# Patient Record
Sex: FEMALE | Race: WHITE | NOT HISPANIC OR LATINO | Employment: FULL TIME | URBAN - METROPOLITAN AREA
[De-identification: names, ages, dates, MRNs, and addresses within clinical notes are randomized per-mention and may not be internally consistent; named-entity substitution may affect disease eponyms.]

---

## 2017-06-15 ENCOUNTER — ALLSCRIPTS OFFICE VISIT (OUTPATIENT)
Dept: OTHER | Facility: OTHER | Age: 22
End: 2017-06-15

## 2017-06-15 ENCOUNTER — LAB REQUISITION (OUTPATIENT)
Dept: LAB | Facility: HOSPITAL | Age: 22
End: 2017-06-15
Payer: COMMERCIAL

## 2017-06-15 DIAGNOSIS — Z11.3 ENCOUNTER FOR SCREENING FOR INFECTIONS WITH PREDOMINANTLY SEXUAL MODE OF TRANSMISSION: ICD-10-CM

## 2017-06-15 DIAGNOSIS — Z01.419 ENCOUNTER FOR GYNECOLOGICAL EXAMINATION WITHOUT ABNORMAL FINDING: ICD-10-CM

## 2017-06-15 PROCEDURE — 87491 CHLMYD TRACH DNA AMP PROBE: CPT | Performed by: OBSTETRICS & GYNECOLOGY

## 2017-06-15 PROCEDURE — G0145 SCR C/V CYTO,THINLAYER,RESCR: HCPCS | Performed by: OBSTETRICS & GYNECOLOGY

## 2017-06-15 PROCEDURE — 87591 N.GONORRHOEAE DNA AMP PROB: CPT | Performed by: OBSTETRICS & GYNECOLOGY

## 2017-06-23 LAB
CHLAMYDIA DNA CVX QL NAA+PROBE: NORMAL
N GONORRHOEA DNA GENITAL QL NAA+PROBE: NORMAL

## 2017-06-25 ENCOUNTER — GENERIC CONVERSION - ENCOUNTER (OUTPATIENT)
Dept: OTHER | Facility: OTHER | Age: 22
End: 2017-06-25

## 2017-06-28 ENCOUNTER — GENERIC CONVERSION - ENCOUNTER (OUTPATIENT)
Dept: OTHER | Facility: OTHER | Age: 22
End: 2017-06-28

## 2017-06-28 LAB
LAB AP GYN PRIMARY INTERPRETATION: NORMAL
Lab: NORMAL

## 2018-01-12 NOTE — RESULT NOTES
Verified Results  (B) C/V/U EXPANDED W/O PAP W/O HPV PLUS (NON-[place]) [id number] 01:32PM Stitzer Bride     Test Name Result Flag Reference   Atopobium Vaginae (NON-[place]) Not Detected     SPECIMEN SOURCE:      C/V/U EXPANDED W/O PAP W/O HPV PLUS ([place])  CLINICAL INFORMATION: Provided Diagnosis Codes: N76 0,N89 8  MOLECULAR               INTERPRETATION:       Results do not favor Bacterial Vaginosis (BV)  MOLECULAR COMMENT:    Lactobacillus DNA is detected  No anaerobic                          bacterial DNA (from G  vaginalis, A  vaginae,                         Megasphaera, BVAB2) is detected  Chlamydia Trachomatis by Multiplex PCR Not Detected     GC BY MULTIPLEX PCR Not Detected     Ureaplasma By Multiplex PCR Detected A    Mycoplasma Genitalium By Multiplex PCR Not Detected     Trichomonas By Multiplex PCR Not Detected     Gardnerella by Multiplex PCR ([place]) Not Detected     Herpes Simplex I by Multiplex ([place]) Not Detected     Herpes Simplex II by Multiplex ([place]) Not Detected     BVAB2 [place] Not Detected     LACTOBACILLUS SPECIES [place] Detected     MEGASPHAERA TYPE-1 Not Detected     CANDIDA GENUS Detected A    C ALBICANS BY PCR Not Detected       (B) CANDIDA REFLEXED SPECIES [id number] 01:32PM Stitzer FigCarde     Test Name Result Flag Reference   C  GLABRATA BY PCR ([place]) Not Detected     C DUBLINIENSIS BY PCR (NON-[place]) Not Detected     PARAPSILOSIS BY PCR ([place]) Not Detected     C TROPICALIS BY PCR (NON[place]) Not Detected     C KRUSEI BY PCR (NON-[place]) Not Detected     ATOPOBIUM VAGINAE (NON-[place]) (4,6,7)  CHLAMYDIA TRACHOMATIS BY MULTIPLEX PCR (1,6,7)  GC BY MULTIPLEX PCR (1,6,7)  UREAPLASMA BY MULTIPLEX PCR (1,2,6,7)  MYCOPLASMA GENITALIUM BY MULTIPLEX PCR (1,2,6,7)  TRICHOMONAS BY MULTIPLEX PCR (1,6,7)  GARDNERELLA BY MULTIPLEX PCR (NON-[place]) (4,6,7)  HERPES SIMPLEX I BY MULTIPLEX ([place]) (1,6,7)  HERPES SIMPLEX II BY MULTIPLEX ([place]) (1,6,7)  BVAB2 NON-[place] (4,6,7)  LACTOBACILLUS SPECIES NON-[place] (4,6,7)  MEGASPHAERA TYPE-1 (4,6,7)  CANDIDA GENUS (3,5,6,7)  C ALBICANS BY PCR (3,5,6,7)  C  GLABRATA BY PCR (NON-[place]) (3,5,6,7)  C DUBLINIENSIS BY PCR (NON-[place]) (3,5,6,7)  C  PARAPSILOSIS BY PCR  (NON-[place]) (3,5,6,7)  C TROPICALIS BY PCR (NON-[place]) (3,5,6,7)  C KRUSEI BY PCR (NON-[place]) (3,5,6,7)  (1)  This test is an in vitro test for the detection of sexually transmitted   infections (STIs) in clinical specimens  The test utilizes   amplification of target DNA by the Polymerase Chain Reaction (PCR)   based on dual priming oligonucleotide technology and detects STI DNA  (2)  Mycoplasma genitalium is a causative agent of Nongonoccocal urethritis   (MOLLY) in men and cervicitis in women  Ureaplasma spp  is associated   with MOLLY in men  (3)  The common causes of vulvovaginal candidiasis include Candida albicans,   Candida tropicalis, Candida glabrata, Candida parapsilosis, Candida   dubliniensis, Cici krusei and a few other species that colonize the   vagina  In several studies, non-albicans species such as tropicalis,   krusei and glabrata have demonstrated resistance to fluconazole and   other related azole antifungals  Using comparable data from another   assay (BD Affirm VPIII microbial identification test), molecular   testing using liquid-based cytology specimens in general for Candida   may not be as sensitive as culture or visual identification  (4)  Target DNA for these organisms was amplified in a multiplex polymerase   chain reaction (PCR) and amplicons were  and identified by   capillary electrophoresis  (5)  This test utilizes amplification of target DNA for Candida genus and   selected Candida species by Polymerase Chain Reaction (PCR)  The limit   of detection for these assays is 0 9 pg for Candida genus, 9 pg for C    albicans, 10 pg for C  dubliniensis,  5 pg for C  glabrata, 20 pg for   C  krusei, 20 pg for C  parapsilosis, and 5 pg for C  tropicalis    (6)  This test was evaluated and its performance characteristics determined   by [facility]  It has not been cleared or approved by   the [place]  Food and Drug Administration  The FDA has determined that   such clearance or approval is not necessary  [facility]   is certified under the Clinical Laboratory Improvement Act of 1988   (CLIA) as qualified to perform high complexity clinical testing   (7)  Results should be interpreted together with past and current clinical   and laboratory data  Plan  Discharge of vagina    · Azithromycin 500 MG Oral Tablet;  Take both tablets po at once

## 2018-01-13 VITALS
DIASTOLIC BLOOD PRESSURE: 64 MMHG | HEART RATE: 81 BPM | HEIGHT: 60 IN | OXYGEN SATURATION: 99 % | SYSTOLIC BLOOD PRESSURE: 112 MMHG | BODY MASS INDEX: 21.6 KG/M2 | WEIGHT: 110 LBS

## 2018-01-16 NOTE — RESULT NOTES
Verified Results  (1) THIN PREP PAP WITH IMAGING 15Jun2017 10:34AM Letitia Lai Order Number: [id number]     Test Name Result Flag Reference   LAB AP CASE REPORT (Report)     Gynecologic Cytology Report            Case: [id number]                  Authorizing Provider: Kevin Montes MD     Collected:      06/15/2017 1034        First Screen:     GRECIA Romero Received:      06/23/2017 4342        Rescreen:       GRECIA Casas                              Specimen:  LIQUID-BASED PAP, SCREENING, Cervix   LAB AP GYN PRIMARY INTERPRETATION      Negative for intraepithelial lesion or malignancy  Electronically signed by GRECIA Casas on 6/28/2017 at 10:43 AM   LAB AP GYN SPECIMEN ADEQUACY      Satisfactory for evaluation  Endocervical/transformation zone component present  LAB AP GYN ADDITIONAL INFORMATION (Report)     [facility]'s FDA approved ,  and ThinPrep Imaging System are   utilized with strict adherence to the 's instruction manual to   prepare gynecologic and non-gynecologic cytology specimens for the   production of ThinPrep slides as well as for gynecologic ThinPrep imaging  These processes have been validated by our laboratory and/or by the     The Pap test is not a diagnostic procedure and should not be used as the   sole means to detect cervical cancer  It is only a screening procedure to   aid in the detection of cervical cancer and its precursors  Both   false-negative and false-positive results have been experienced  Your   patient's test result should be interpreted in this context together with   the history and clinical findings

## 2018-01-17 NOTE — RESULT NOTES
Verified Results  (1) CHLAMYDIA/GC AMPLIFIED DNA, PCR [id number] 10:34AM Norberto Justice    Order Number: [id number]     Test Name Result Flag Reference   CHLAMYDIA,AMPLIFIED DNA PROBE   C  trachomatis Amplified DNA Negative   C  trachomatis Amplified DNA Negative   N  GONORRHOEAE AMPLIFIED DNA   N  gonorrhoeae Amplified DNA Negative   N  gonorrhoeae Amplified DNA Negative

## 2018-06-21 ENCOUNTER — ANNUAL EXAM (OUTPATIENT)
Dept: GYNECOLOGY | Facility: CLINIC | Age: 23
End: 2018-06-21
Payer: COMMERCIAL

## 2018-06-21 VITALS
WEIGHT: 115.8 LBS | HEART RATE: 76 BPM | DIASTOLIC BLOOD PRESSURE: 62 MMHG | SYSTOLIC BLOOD PRESSURE: 106 MMHG | HEIGHT: 60 IN | BODY MASS INDEX: 22.74 KG/M2

## 2018-06-21 DIAGNOSIS — Z01.419 ENCOUNTER FOR ANNUAL ROUTINE GYNECOLOGICAL EXAMINATION: Primary | ICD-10-CM

## 2018-06-21 DIAGNOSIS — Z11.3 ROUTINE SCREENING FOR STI (SEXUALLY TRANSMITTED INFECTION): ICD-10-CM

## 2018-06-21 PROCEDURE — 87491 CHLMYD TRACH DNA AMP PROBE: CPT | Performed by: OBSTETRICS & GYNECOLOGY

## 2018-06-21 PROCEDURE — S0612 ANNUAL GYNECOLOGICAL EXAMINA: HCPCS | Performed by: OBSTETRICS & GYNECOLOGY

## 2018-06-21 PROCEDURE — 87591 N.GONORRHOEAE DNA AMP PROB: CPT | Performed by: OBSTETRICS & GYNECOLOGY

## 2018-06-21 NOTE — PROGRESS NOTES
Assessment/Plan:  Normal gynecologic exam  GC, chlamydia Cx's  Self breast exams monthly-   Calcium 1000 milligrams daily- she does  Exercise- she does daily  Return the office in 1 year  Diagnoses and all orders for this visit:    Encounter for annual routine gynecological examination    Routine screening for STI (sexually transmitted infection)  -     Chlamydia/GC amplified DNA by PCR              Subjective:        Patient ID: Lesa Sy is a 24 y o  female  Shadia Ortiz is here for an annual visit  She just graduated the [employer] and is looking for some post graduate work and division 1 school for for athletic training  Her periods are regular  She is not currently in a relationship  She had intercourse last 2 months ago using condoms  The following portions of the patient's history were reviewed and updated as appropriate: She  has no past medical history on file  Patient Active Problem List    Diagnosis Date Noted    Encounter for annual routine gynecological examination 06/21/2018    Routine screening for STI (sexually transmitted infection) 06/21/2018   PMH:  Nephrolithiasis 9/15   Chlamydia '15  Ureaplasma, Monilia '16     She  has a past surgical history that includes Tonsillectomy and Wrist surgery (Left)  Her family history includes Hypertension in her father and mother; Nephrolithiasis in her father and mother  M- HTN, nephrolithiasis  She  reports that she has never smoked  She has never used smokeless tobacco  She reports that she drinks alcohol  She reports that she does not use drugs  SH:  [employer] '17, will be going to the [employer] for graduate school in athletic training  She is currently not sexually active  She just graduated the [employer] and is looking for some post graduate work and division 1 school for for athletic training  No current outpatient prescriptions on file       No current facility-administered medications for this visit  No current outpatient prescriptions on file prior to visit  No current facility-administered medications on file prior to visit  She has No Known Allergies       Review of Systems   Constitutional: Negative for activity change, appetite change, fatigue and unexpected weight change  Eyes: Negative for visual disturbance  Respiratory: Negative for cough, chest tightness, shortness of breath and wheezing  Cardiovascular: Negative for chest pain, palpitations and leg swelling  Breast: Patient denies tenderness, nipple discharge, masses, or erythema  Gastrointestinal: Negative for abdominal distention, abdominal pain, blood in stool, constipation, diarrhea, nausea and vomiting  Endocrine: Negative for cold intolerance and heat intolerance  Genitourinary: Negative for decreased urine volume, difficulty urinating, dyspareunia, dysuria, frequency, hematuria, menstrual problem, pelvic pain, urgency, vaginal bleeding, vaginal discharge and vaginal pain  Musculoskeletal: Negative for arthralgias  Skin: Negative for rash  Neurological: Negative for weakness, light-headedness, numbness and headaches  Hematological: Does not bruise/bleed easily  Psychiatric/Behavioral: Negative for agitation, behavioral problems and sleep disturbance  The patient is not nervous/anxious  Objective:    Vitals:    06/21/18 1334   BP: 106/62   BP Location: Right arm   Patient Position: Sitting   Cuff Size: Standard   Pulse: 76   Weight: 52 5 kg (115 lb 12 8 oz)   Height: 5' (1 524 m)            Physical Exam   Constitutional: She is oriented to person, place, and time  She appears well-developed and well-nourished  HENT:   Head: Normocephalic and atraumatic  Eyes: Conjunctivae and EOM are normal  Pupils are equal, round, and reactive to light  Neck: Normal range of motion  Neck supple  No tracheal deviation present   No thyromegaly present  Cardiovascular: Normal rate, regular rhythm and normal heart sounds  No murmur heard  Pulmonary/Chest: Effort normal and breath sounds normal  No respiratory distress  She has no wheezes  Right breast exhibits no inverted nipple, no mass, no nipple discharge, no skin change and no tenderness  Left breast exhibits no inverted nipple, no mass, no nipple discharge, no skin change and no tenderness  Breasts are symmetrical    Abdominal: Soft  Bowel sounds are normal  She exhibits no distension and no mass  There is no tenderness  Genitourinary: Vagina normal and uterus normal  Rectal exam shows no external hemorrhoid  No breast swelling, tenderness, discharge or bleeding  There is no rash, tenderness or lesion on the right labia  There is no rash, tenderness or lesion on the left labia  Uterus is not deviated, not enlarged and not tender  Cervix exhibits no motion tenderness and no discharge  Right adnexum displays no mass, no tenderness and no fullness  Left adnexum displays no mass, no tenderness and no fullness  Musculoskeletal: Normal range of motion  Neurological: She is alert and oriented to person, place, and time  Skin: Skin is warm and dry  Psychiatric: She has a normal mood and affect  Her behavior is normal  Judgment and thought content normal    Nursing note and vitals reviewed

## 2018-06-25 LAB
CHLAMYDIA DNA CVX QL NAA+PROBE: NORMAL
N GONORRHOEA DNA GENITAL QL NAA+PROBE: NORMAL

## 2018-09-04 ENCOUNTER — APPOINTMENT (OUTPATIENT)
Dept: LAB | Age: 23
End: 2018-09-04
Attending: PREVENTIVE MEDICINE

## 2018-09-04 ENCOUNTER — TRANSCRIBE ORDERS (OUTPATIENT)
Dept: ADMINISTRATIVE | Age: 23
End: 2018-09-04

## 2018-09-04 DIAGNOSIS — Z01.84 IMMUNITY STATUS TESTING: ICD-10-CM

## 2018-09-04 DIAGNOSIS — Z11.1 SCREENING EXAMINATION FOR PULMONARY TUBERCULOSIS: ICD-10-CM

## 2018-09-04 DIAGNOSIS — Z01.84 IMMUNITY STATUS TESTING: Primary | ICD-10-CM

## 2018-09-04 PROCEDURE — 86787 VARICELLA-ZOSTER ANTIBODY: CPT

## 2018-09-04 PROCEDURE — 36415 COLL VENOUS BLD VENIPUNCTURE: CPT

## 2018-09-04 PROCEDURE — 86480 TB TEST CELL IMMUN MEASURE: CPT

## 2018-09-06 LAB
ANNOTATION COMMENT IMP: NORMAL
GAMMA INTERFERON BACKGROUND BLD IA-ACNC: 0.03 IU/ML
M TB IFN-G BLD-IMP: NEGATIVE
M TB IFN-G CD4+ BCKGRND COR BLD-ACNC: 0.05 IU/ML
M TB IFN-G CD4+ T-CELLS BLD-ACNC: 0.08 IU/ML
MITOGEN IGNF BLD-ACNC: 8.03 IU/ML
QUANTIFERON-TB GOLD IN TUBE: NORMAL
SERVICE CMNT-IMP: NORMAL
VZV IGG SER IA-ACNC: NORMAL

## 2019-07-08 ENCOUNTER — ANNUAL EXAM (OUTPATIENT)
Dept: GYNECOLOGY | Facility: CLINIC | Age: 24
End: 2019-07-08
Payer: COMMERCIAL

## 2019-07-08 VITALS
BODY MASS INDEX: 23.48 KG/M2 | HEIGHT: 60 IN | SYSTOLIC BLOOD PRESSURE: 116 MMHG | WEIGHT: 119.6 LBS | DIASTOLIC BLOOD PRESSURE: 52 MMHG

## 2019-07-08 DIAGNOSIS — Z01.419 ENCOUNTER FOR ANNUAL ROUTINE GYNECOLOGICAL EXAMINATION: Primary | ICD-10-CM

## 2019-07-08 PROCEDURE — 99395 PREV VISIT EST AGE 18-39: CPT | Performed by: OBSTETRICS & GYNECOLOGY

## 2019-07-08 NOTE — PROGRESS NOTES
Assessment/Plan:  Normal gynecologic exam  Pap q 3 yrs- '20  GC, chlamydia Cx's- NA  Self breast exams monthly-   Calcium 1000 milligrams daily- she does  Exercise- she does daily  Return the office in 1 year            Diagnoses and all orders for this visit:    Encounter for annual routine gynecological examination              Subjective:        Patient ID: Perla yDer is a 21 y o  female  Cheryle Cough is here for her annual visit  She is without complaints  Her menses are regular  She is not in a relationship  The following portions of the patient's history were reviewed and updated as appropriate: She  has no past medical history on file  Patient Active Problem List    Diagnosis Date Noted    Encounter for annual routine gynecological examination 06/21/2018    Routine screening for STI (sexually transmitted infection) 06/21/2018   PMH:  Nephrolithiasis 9/15   Chlamydia '15      Ureaplasma, Monilia '16   She  has a past surgical history that includes Tonsillectomy and Wrist surgery (Left)  Her family history includes Hypertension in her father and mother; Nephrolithiasis in her father and mother  FH:  F, M- HTN, nephrolithiasis  She  reports that she has never smoked  She has never used smokeless tobacco  She reports that she drinks alcohol  She reports that she does not use drugs  SH:  [employer] '17, [employer] 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  She is currently not sexually active  Works for [employer] as the  for [employer]  She will be changing to [address] next year which will be less of a commute  No current outpatient medications on file  No current facility-administered medications for this visit  No current outpatient medications on file prior to visit  No current facility-administered medications on file prior to visit  She has No Known Allergies       Review of Systems   Constitutional: Negative for activity change, appetite change, fatigue and unexpected weight change  Eyes: Negative for visual disturbance  Respiratory: Negative for cough, chest tightness, shortness of breath and wheezing  Cardiovascular: Negative for chest pain, palpitations and leg swelling  Breast: Patient denies tenderness, nipple discharge, masses, or erythema  Gastrointestinal: Negative for abdominal distention, abdominal pain, blood in stool, constipation, diarrhea, nausea and vomiting  Endocrine: Negative for cold intolerance and heat intolerance  Genitourinary: Negative for decreased urine volume, difficulty urinating, dyspareunia, dysuria, frequency, hematuria, menstrual problem, pelvic pain, urgency, vaginal bleeding, vaginal discharge and vaginal pain  Musculoskeletal: Negative for arthralgias  Skin: Negative for rash  Neurological: Negative for weakness, light-headedness, numbness and headaches  Hematological: Does not bruise/bleed easily  Psychiatric/Behavioral: Negative for agitation, behavioral problems and sleep disturbance  The patient is not nervous/anxious  Objective: There were no vitals filed for this visit  Physical Exam   Constitutional: She is oriented to person, place, and time  She appears well-developed and well-nourished  HENT:   Head: Normocephalic and atraumatic  Eyes: Pupils are equal, round, and reactive to light  Conjunctivae and EOM are normal    Neck: Normal range of motion  Neck supple  No tracheal deviation present  No thyromegaly present  Cardiovascular: Normal rate, regular rhythm and normal heart sounds  No murmur heard  Pulmonary/Chest: Effort normal and breath sounds normal  No respiratory distress  She has no wheezes  Right breast exhibits no inverted nipple, no mass, no nipple discharge, no skin change and no tenderness   Left breast exhibits no inverted nipple, no mass, no nipple discharge, no skin change and no tenderness  No breast tenderness, discharge or bleeding  Breasts are symmetrical    Abdominal: Soft  Bowel sounds are normal  She exhibits no distension and no mass  There is no tenderness  Genitourinary: Vagina normal and uterus normal  Rectal exam shows no external hemorrhoid  No breast tenderness, discharge or bleeding  There is no rash, tenderness or lesion on the right labia  There is no rash, tenderness or lesion on the left labia  Uterus is not deviated, not enlarged and not tender  Cervix exhibits no motion tenderness and no discharge  Right adnexum displays no mass, no tenderness and no fullness  Left adnexum displays no mass, no tenderness and no fullness  Genitourinary Comments: Urethral meatus within normal limits  Perineum within normal limits  Bladder well supported  Musculoskeletal: Normal range of motion  Neurological: She is alert and oriented to person, place, and time  Skin: Skin is warm and dry  Psychiatric: She has a normal mood and affect  Her behavior is normal  Judgment and thought content normal    Nursing note and vitals reviewed

## 2020-01-27 ENCOUNTER — TELEPHONE (OUTPATIENT)
Dept: GYNECOLOGY | Facility: CLINIC | Age: 25
End: 2020-01-27

## 2020-01-27 NOTE — TELEPHONE ENCOUNTER
P minimal symptoms right now, itching is less than it was yesterday  She uses tampons  Hydrocortisone ointment if needed  Call if symptoms persist after the menses

## 2020-01-27 NOTE — TELEPHONE ENCOUNTER
States last Sunday she started with what felt like a mild yeast infection  Used a 3 day monistat, felt a little better, however still had some itching, yesterday she noticed a strong odor  Today her period started  Wanted to know if there was anything that could be prescribed or if she needed to be seen  Her period can be heavy and can last 5-7 days  Was not sure if she should wait until her period ends, or if she should come in today, or if you had any other suggestions

## 2020-02-08 ENCOUNTER — OFFICE VISIT (OUTPATIENT)
Dept: URGENT CARE | Facility: CLINIC | Age: 25
End: 2020-02-08
Payer: COMMERCIAL

## 2020-02-08 VITALS
HEIGHT: 60 IN | WEIGHT: 115 LBS | BODY MASS INDEX: 22.58 KG/M2 | TEMPERATURE: 97.8 F | DIASTOLIC BLOOD PRESSURE: 65 MMHG | RESPIRATION RATE: 16 BRPM | HEART RATE: 68 BPM | SYSTOLIC BLOOD PRESSURE: 131 MMHG

## 2020-02-08 DIAGNOSIS — H65.01 RIGHT ACUTE SEROUS OTITIS MEDIA, RECURRENCE NOT SPECIFIED: Primary | ICD-10-CM

## 2020-02-08 PROCEDURE — G0382 LEV 3 HOSP TYPE B ED VISIT: HCPCS | Performed by: PHYSICIAN ASSISTANT

## 2020-02-08 RX ORDER — CLINDAMYCIN AND BENZOYL PEROXIDE 10; 50 MG/G; MG/G
GEL TOPICAL
COMMUNITY
Start: 2020-01-07

## 2020-02-08 RX ORDER — PSEUDOEPHEDRINE HCL 60 MG/1
60 TABLET ORAL EVERY 6 HOURS PRN
Qty: 30 TABLET | Refills: 0 | Status: SHIPPED | OUTPATIENT
Start: 2020-02-08 | End: 2020-07-15

## 2020-02-08 RX ORDER — DAPSONE 50 MG/G
GEL TOPICAL
COMMUNITY
Start: 2020-01-02

## 2020-02-08 RX ORDER — TRETINOIN 1 MG/G
CREAM TOPICAL
COMMUNITY
Start: 2019-12-31

## 2020-02-08 RX ORDER — PREDNISONE 50 MG/1
50 TABLET ORAL DAILY
Qty: 5 TABLET | Refills: 0 | Status: SHIPPED | OUTPATIENT
Start: 2020-02-08 | End: 2020-02-13

## 2020-02-08 NOTE — PROGRESS NOTES
[facility]        NAME: Candice Mcdonough is a 21 y o  female  : 1995    MRN: [id number]  DATE: 2020  TIME: 9:57 AM    Assessment and Plan   Right acute serous otitis media, recurrence not specified [H65 01]  1  Right acute serous otitis media, recurrence not specified  predniSONE 50 mg tablet    pseudoephedrine (SUDAFED) 60 mg tablet         Patient Instructions   Prescriptions sent to the pharmacy for prednisone and pseudoephedrine-use as directed  Saline nasal spray several times daily, Flonase in a m , Tylenol/ibuprofen as  needed for fever or pain  Follow up with an ENT specialist if symptoms persist   Proceed to  ER if symptoms worsen  Chief Complaint     Chief Complaint   Patient presents with    Earache     R ear pain started 2 days ago, no other sx, comes and goes         History of Present Illness   The patient is a 53-year-old female who presents with right ear pain that started 2 days ago  Negative drainage or hearing changes  Negative left ear pain  Mild nasal and sinus congestion  Negative cough  Negative sore throat  Negative fever or shaking chills  Negative dizziness  Negative headache  HPI    Review of Systems   Review of Systems   Constitutional: Negative for activity change, chills, fatigue and fever  HENT: Positive for congestion and ear pain  Negative for ear discharge, facial swelling, hearing loss, mouth sores, postnasal drip, rhinorrhea, sinus pressure, sinus pain, sneezing, sore throat and trouble swallowing  Eyes: Negative for pain, discharge, redness and itching  Respiratory: Negative for apnea, cough, chest tightness, shortness of breath, wheezing and stridor  Cardiovascular: Negative for chest pain  Gastrointestinal: Negative for abdominal distention, abdominal pain, diarrhea, nausea and vomiting  Genitourinary: Negative for difficulty urinating  Musculoskeletal: Negative for arthralgias and myalgias     Skin: Negative for pallor and rash  Allergic/Immunologic: Negative  Neurological: Negative for dizziness, light-headedness and headaches  Hematological: Negative  Psychiatric/Behavioral: Negative  All other systems reviewed and are negative  Current Medications       Current Outpatient Medications:     clindamycin-benzoyl peroxide (BENZACLIN) gel, , Disp: , Rfl:     Dapsone 5 % topical gel, , Disp: , Rfl:     tretinoin (RETIN-A) 0 1 % cream, , Disp: , Rfl:     predniSONE 50 mg tablet, Take 1 tablet (50 mg total) by mouth daily for 5 days, Disp: 5 tablet, Rfl: 0    pseudoephedrine (SUDAFED) 60 mg tablet, Take 1 tablet (60 mg total) by mouth every 6 (six) hours as needed for congestion, Disp: 30 tablet, Rfl: 0    Current Allergies     Allergies as of 02/08/2020    (No Known Allergies)            The following portions of the patient's history were reviewed and updated as appropriate: allergies, current medications, past family history, past medical history, past social history, past surgical history and problem list      Past Medical History:   Diagnosis Date    Kidney stones        Past Surgical History:   Procedure Laterality Date    TONSILLECTOMY      WRIST SURGERY Left        Family History   Problem Relation Age of Onset    Hypertension Mother     Nephrolithiasis Mother     Hypertension Father     Nephrolithiasis Father          Medications have been verified  Objective   /65 (Patient Position: Sitting)   Pulse 68   Temp 97 8 °F (36 6 °C) (Temporal)   Resp 16   Ht 5' (1 524 m)   Wt 52 2 kg (115 lb)   BMI 22 46 kg/m²        Physical Exam     Physical Exam   Constitutional: She appears well-developed and well-nourished  No distress  HENT:   Head: Normocephalic and atraumatic  Right Ear: Hearing, external ear and ear canal normal  No lacerations  No drainage, swelling or tenderness  No foreign bodies  No mastoid tenderness   Tympanic membrane is not injected, not scarred, not perforated, not erythematous, not retracted and not bulging  A middle ear effusion is present  No hemotympanum  No decreased hearing is noted  Left Ear: Hearing, tympanic membrane, external ear and ear canal normal  No lacerations  No drainage, swelling or tenderness  No foreign bodies  No mastoid tenderness  Tympanic membrane is not injected, not scarred, not perforated, not erythematous, not retracted and not bulging  No middle ear effusion  No hemotympanum  No decreased hearing is noted  Nose: Nose normal    Mouth/Throat: Oropharynx is clear and moist  No oropharyngeal exudate  Skin: She is not diaphoretic  Nursing note and vitals reviewed

## 2020-02-08 NOTE — PATIENT INSTRUCTIONS
Prescriptions sent to the pharmacy for prednisone and pseudoephedrine-use as directed  Saline nasal spray several times daily, Flonase in a m , Tylenol/ibuprofen as needed for fever or pain  Follow up with an ENT specialist if symptoms persist   Proceed to  ER if symptoms worsen  Serous Otitis Media   WHAT YOU NEED TO KNOW:   Serous otitis media is fluid trapped behind your tympanic membrane (eardrum), without an ear infection  Your eardrum is in your middle ear  Serous otitis media is also called otitis media with effusion  You may have fluid in your ear for months, but it usually goes away on its own  The fluid may be in one or both ears  The fluid may cause muffled sounds, and you may feel like your ears are full  Serous otitis media may be caused by an upper respiratory infection or allergies  It is most common in the fall and early spring  DISCHARGE INSTRUCTIONS:   Return to the emergency department if:   · You have a fever  · You have a sudden loss of hearing in your affected ear  · You develop a severe headache and stiff neck  · You have a seizure  Contact your healthcare provider if:   · You have fluid draining from your ear  · You have new symptoms  · You have questions or concerns about your condition or care  Follow up with your healthcare provider as directed: Your ears will need to be checked regularly  You may need to see a specialist  Write down your questions so you remember to ask them during your visits  © 2017 [address] Information is for End User's use only and may not be sold, redistributed or otherwise used for commercial purposes  All illustrations and images included in CareNotes® are the copyrighted property of A D A M , Inc  or Rubin Knight  The above information is an  only  It is not intended as medical advice for individual conditions or treatments   Talk to your doctor, nurse or pharmacist before following any medical regimen to see if it is safe and effective for you

## 2020-05-11 ENCOUNTER — OFFICE VISIT (OUTPATIENT)
Dept: URGENT CARE | Facility: CLINIC | Age: 25
End: 2020-05-11
Payer: COMMERCIAL

## 2020-05-11 VITALS
TEMPERATURE: 98.2 F | RESPIRATION RATE: 18 BRPM | HEART RATE: 83 BPM | BODY MASS INDEX: 22.97 KG/M2 | OXYGEN SATURATION: 99 % | WEIGHT: 117 LBS | HEIGHT: 60 IN | DIASTOLIC BLOOD PRESSURE: 80 MMHG | SYSTOLIC BLOOD PRESSURE: 135 MMHG

## 2020-05-11 DIAGNOSIS — S01.112A LACERATION OF LEFT EYEBROW, INITIAL ENCOUNTER: Primary | ICD-10-CM

## 2020-05-11 PROCEDURE — 12011 RPR F/E/E/N/L/M 2.5 CM/<: CPT | Performed by: PHYSICIAN ASSISTANT

## 2020-05-11 PROCEDURE — G0382 LEV 3 HOSP TYPE B ED VISIT: HCPCS | Performed by: PHYSICIAN ASSISTANT

## 2020-07-14 NOTE — PROGRESS NOTES
Assessment/Plan:  Normal gynecologic exam  Pap q 3 yrs- '20  GC, chlamydia Cx's  Self breast exams monthly-   Calcium 1000 milligrams daily- she does  Exercise- she does daily  Return the office in 1 year  Diagnoses and all orders for this visit:    Encounter for annual routine gynecological examination  -     Liquid-based pap, screening    Screening for cervical cancer  -     Liquid-based pap, screening    Exposure to sexually transmitted disease (STD)  -     Chlamydia/GC amplified DNA by PCR; Future  -     Chlamydia/GC amplified DNA by PCR              Subjective:        Patient ID: Caren Dempsey is a 31 y o  female  Benjariannaharish Pepper is here for her annual visit  She is without complaints  She still suffers from dysmenorrhea for which she uses Advil  It is usually worse day 2  Her mother developed breast cancer last year  She was in a 10 month relationship which ended recently  I asked if she would like cultures and she said yes  The following portions of the patient's history were reviewed and updated as appropriate: She  has a past medical history of Kidney stones  Patient Active Problem List    Diagnosis Date Noted    Encounter for annual routine gynecological examination 06/21/2018    Routine screening for STI (sexually transmitted infection) 06/21/2018   PMH:  Nephrolithiasis 9/15   Chlamydia '15      Ureaplasma, Monilia '16   She  has a past surgical history that includes Tonsillectomy and Wrist surgery (Left)  Her family history includes Breast cancer in her mother; Heart disease in her paternal grandfather; Hypertension in her father and mother; Nephrolithiasis in her father and mother; No Known Problems in her brother and sister  FH:  F, M- HTN, nephrolithiasis  M- Breast Ca 10/19 58  She  reports that she has never smoked  She has never used smokeless tobacco  She reports current alcohol use  She reports that she does not use drugs     SH:  [employer], [address] [facility] 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  She is currently not sexually active  Works for [employer] as the  for [employer]  Now is at [facility] but 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  Current Outpatient Medications   Medication Sig Dispense Refill    clindamycin-benzoyl peroxide (BENZACLIN) gel       Dapsone 5 % topical gel       tretinoin (RETIN-A) 0 1 % cream        No current facility-administered medications for this visit  Current Outpatient Medications on File Prior to Visit   Medication Sig    clindamycin-benzoyl peroxide (BENZACLIN) gel     Dapsone 5 % topical gel     tretinoin (RETIN-A) 0 1 % cream      No current facility-administered medications on file prior to visit  She has No Known Allergies       Review of Systems   Constitutional: Negative for activity change, appetite change, chills, fatigue, fever and unexpected weight change  HENT: Negative for congestion, rhinorrhea, sinus pressure, sore throat and trouble swallowing  Eyes: Negative for discharge, redness, itching and visual disturbance  Respiratory: Negative for cough, chest tightness, shortness of breath and wheezing  Cardiovascular: Negative for chest pain, palpitations and leg swelling  Gastrointestinal: Negative for abdominal distention, abdominal pain, blood in stool, constipation, diarrhea, nausea and vomiting  Genitourinary: Positive for menstrual problem  Negative for decreased urine volume, difficulty urinating, dyspareunia, dysuria, frequency, hematuria, pelvic pain, urgency, vaginal bleeding, vaginal discharge and vaginal pain  Musculoskeletal: Negative for arthralgias  Skin: Negative for rash  Neurological: Negative for weakness, light-headedness, numbness and headaches  Hematological: Does not bruise/bleed easily     Psychiatric/Behavioral: Negative for agitation, behavioral problems and sleep disturbance  The patient is not nervous/anxious  Objective:    Vitals:    07/15/20 0747   BP: 124/80   BP Location: Left arm   Patient Position: Sitting   Cuff Size: Standard   Temp: 99 2 °F (37 3 °C)   Weight: 50 5 kg (111 lb 6 4 oz)   Height: 5' (1 524 m)            Physical Exam   Constitutional: She is oriented to person, place, and time  She appears well-developed and well-nourished  HENT:   Head: Normocephalic and atraumatic  Eyes: Pupils are equal, round, and reactive to light  Conjunctivae and EOM are normal    Neck: Normal range of motion  Neck supple  No tracheal deviation present  No thyromegaly present  Cardiovascular: Normal rate, regular rhythm and normal heart sounds  No murmur heard  Pulmonary/Chest: Effort normal and breath sounds normal  No respiratory distress  She has no wheezes  Right breast exhibits no inverted nipple, no mass, no nipple discharge, no skin change and no tenderness  Left breast exhibits no inverted nipple, no mass, no nipple discharge, no skin change and no tenderness  No breast tenderness, discharge or bleeding  Breasts are symmetrical    Abdominal: Soft  Bowel sounds are normal  She exhibits no distension and no mass  There is no tenderness  Genitourinary: Vagina normal and uterus normal  Rectal exam shows no external hemorrhoid  No breast tenderness, discharge or bleeding  There is no rash, tenderness or lesion on the right labia  There is no rash, tenderness or lesion on the left labia  Uterus is not deviated, not enlarged and not tender  Cervix exhibits no motion tenderness and no discharge  Right adnexum displays no mass, no tenderness and no fullness  Left adnexum displays no mass, no tenderness and no fullness  Genitourinary Comments: Urethral meatus within normal limits  Perineum within normal limits  Bladder well supported  Musculoskeletal: Normal range of motion  Neurological: She is alert and oriented to person, place, and time  Skin: Skin is warm and dry  Psychiatric: She has a normal mood and affect  Her behavior is normal  Judgment and thought content normal    Nursing note and vitals reviewed

## 2020-07-15 ENCOUNTER — ANNUAL EXAM (OUTPATIENT)
Dept: GYNECOLOGY | Facility: CLINIC | Age: 25
End: 2020-07-15
Payer: COMMERCIAL

## 2020-07-15 VITALS
SYSTOLIC BLOOD PRESSURE: 124 MMHG | BODY MASS INDEX: 21.87 KG/M2 | DIASTOLIC BLOOD PRESSURE: 80 MMHG | HEIGHT: 60 IN | TEMPERATURE: 99.2 F | WEIGHT: 111.4 LBS

## 2020-07-15 DIAGNOSIS — Z12.4 SCREENING FOR CERVICAL CANCER: ICD-10-CM

## 2020-07-15 DIAGNOSIS — Z20.2 EXPOSURE TO SEXUALLY TRANSMITTED DISEASE (STD): ICD-10-CM

## 2020-07-15 DIAGNOSIS — Z01.419 ENCOUNTER FOR ANNUAL ROUTINE GYNECOLOGICAL EXAMINATION: Primary | ICD-10-CM

## 2020-07-15 PROCEDURE — 87591 N.GONORRHOEAE DNA AMP PROB: CPT | Performed by: OBSTETRICS & GYNECOLOGY

## 2020-07-15 PROCEDURE — 99395 PREV VISIT EST AGE 18-39: CPT | Performed by: OBSTETRICS & GYNECOLOGY

## 2020-07-15 PROCEDURE — G0124 SCREEN C/V THIN LAYER BY MD: HCPCS | Performed by: PATHOLOGY

## 2020-07-15 PROCEDURE — 87491 CHLMYD TRACH DNA AMP PROBE: CPT | Performed by: OBSTETRICS & GYNECOLOGY

## 2020-07-15 PROCEDURE — G0145 SCR C/V CYTO,THINLAYER,RESCR: HCPCS | Performed by: PATHOLOGY

## 2020-07-17 LAB
C TRACH DNA SPEC QL NAA+PROBE: NEGATIVE
N GONORRHOEA DNA SPEC QL NAA+PROBE: NEGATIVE

## 2020-07-22 LAB
LAB AP GYN PRIMARY INTERPRETATION: ABNORMAL
Lab: ABNORMAL
PATH INTERP SPEC-IMP: ABNORMAL

## 2020-07-23 ENCOUNTER — TELEPHONE (OUTPATIENT)
Dept: OTHER | Facility: OTHER | Age: 25
End: 2020-07-23

## 2020-07-23 NOTE — TELEPHONE ENCOUNTER
Patient is retuning a missed call from Dr Roberto Houston  Please call patient back regarding missed call

## 2020-08-14 PROCEDURE — 57454 BX/CURETT OF CERVIX W/SCOPE: CPT | Performed by: OBSTETRICS & GYNECOLOGY

## 2020-08-14 NOTE — PROGRESS NOTES
Assessment/Plan:  Normal colposcopic findings  Will repeat Pap next year  Moving to [place] for a job with the [employer] in September Diagnoses and all orders for this visit:    LGSIL on Pap smear of cervix  -     Colposcopy              Subjective:        Patient ID: Sesar Naranjo is a 31 y o  female  HPI    The following portions of the patient's history were reviewed and updated as appropriate: She  has a past medical history of Kidney stones  Patient Active Problem List    Diagnosis Date Noted    Encounter for annual routine gynecological examination 06/21/2018    Routine screening for STI (sexually transmitted infection) 06/21/2018     She  has a past surgical history that includes Tonsillectomy and Wrist surgery (Left)  Her family history includes Breast cancer in her mother; Heart disease in her paternal grandfather; Hypertension in her father and mother; Nephrolithiasis in her father and mother; No Known Problems in her brother and sister  She  reports that she has never smoked  She has never used smokeless tobacco  She reports current alcohol use  She reports that she does not use drugs  Current Outpatient Medications   Medication Sig Dispense Refill    clindamycin-benzoyl peroxide (BENZACLIN) gel       Dapsone 5 % topical gel       tretinoin (RETIN-A) 0 1 % cream        No current facility-administered medications for this visit  Current Outpatient Medications on File Prior to Visit   Medication Sig    clindamycin-benzoyl peroxide (BENZACLIN) gel     Dapsone 5 % topical gel     tretinoin (RETIN-A) 0 1 % cream      No current facility-administered medications on file prior to visit  She has No Known Allergies           Objective: There were no vitals filed for this visit              Colposcopy    Date/Time: 8/14/2020 6:22 PM  Performed by: Keny Rome MD  Authorized by: Keny Rome MD     Consent:     Consent obtained:  Verbal and written    Consent given by: Patient    Procedural risks discussed:  Bleeding, failure rate, infection and repeat procedure    Patient questions answered: yes      Patient agrees, verbalizes understanding, and wants to proceed: yes      Instructions and paperwork completed: yes    Pre-procedure:     Pre-procedure timeout performed: yes      Premeds:  Ibuprofen  Indication:     Indication:  LSIL  Procedure:     Procedure: Colposcopy w/ cervical biopsy and ECC      Under satisfactory analgesia the patient was prepped and draped in the dorsal lithotomy position: yes      Malone speculum was placed in the vagina: yes      Under colposcopic examination the transition zone was seen in entirety: yes      Endocervix was curetted using a Kevorkian curette: yes      Cervical biopsy performed with a cervical biopsy punch: yes      Monsel's solution was applied: yes      Biopsy(s): yes      Location:  6, 12    Specimen to pathology: yes    Comments:      Normal colposcopic findings  Squamocolumnar junction just at the outside of the external os  No lesions seen  Squamous metaplasia present posteriorly    The canal was normal  Biopsies well tolerated

## 2020-08-17 ENCOUNTER — PROCEDURE VISIT (OUTPATIENT)
Dept: GYNECOLOGY | Facility: CLINIC | Age: 25
End: 2020-08-17
Payer: COMMERCIAL

## 2020-08-17 VITALS
HEIGHT: 60 IN | WEIGHT: 111.6 LBS | DIASTOLIC BLOOD PRESSURE: 70 MMHG | SYSTOLIC BLOOD PRESSURE: 120 MMHG | BODY MASS INDEX: 21.91 KG/M2 | TEMPERATURE: 99.2 F

## 2020-08-17 DIAGNOSIS — R87.612 LGSIL ON PAP SMEAR OF CERVIX: Primary | ICD-10-CM

## 2020-08-17 PROCEDURE — 88305 TISSUE EXAM BY PATHOLOGIST: CPT | Performed by: PATHOLOGY

## 2020-08-21 NOTE — RESULT ENCOUNTER NOTE
P  I called and reviewed the results  Mild cervical dysplasia which we do not treat  Only rec is repeat Pap smear next year  She has no additional risk factors  She is a non smoker and not exposed to 2nd hand smoke  Reassured  She leaves for [facility] 9/2

## 2020-08-31 ENCOUNTER — APPOINTMENT (OUTPATIENT)
Dept: RADIOLOGY | Facility: AMBULARY SURGERY CENTER | Age: 25
End: 2020-08-31
Payer: COMMERCIAL

## 2020-08-31 ENCOUNTER — OFFICE VISIT (OUTPATIENT)
Dept: OBGYN CLINIC | Facility: CLINIC | Age: 25
End: 2020-08-31
Payer: COMMERCIAL

## 2020-08-31 VITALS
BODY MASS INDEX: 22.19 KG/M2 | HEART RATE: 83 BPM | SYSTOLIC BLOOD PRESSURE: 131 MMHG | DIASTOLIC BLOOD PRESSURE: 83 MMHG | HEIGHT: 60 IN | WEIGHT: 113 LBS

## 2020-08-31 DIAGNOSIS — M25.561 ACUTE PAIN OF RIGHT KNEE: ICD-10-CM

## 2020-08-31 DIAGNOSIS — M25.561 ACUTE PAIN OF RIGHT KNEE: Primary | ICD-10-CM

## 2020-08-31 PROCEDURE — 99203 OFFICE O/P NEW LOW 30 MIN: CPT | Performed by: PHYSICAL MEDICINE & REHABILITATION

## 2020-08-31 PROCEDURE — 73562 X-RAY EXAM OF KNEE 3: CPT

## 2020-08-31 NOTE — PROGRESS NOTES
1  Acute pain of right knee  XR knee 3 vw right non injury    diclofenac sodium (VOLTAREN) 1 %     Orders Placed This Encounter   Procedures    XR knee 3 vw right non injury        Imaging Studies (I personally reviewed images in PACS and report):  Right knee x-rays most recent to this encounter reviewed  These images show no acute osseous abnormalities or soft tissue abnormalities  No joint effusion  Impression: This is a patient presents with right lateral knee pain and swelling that started a few days ago without a specific incident  This is most likely secondary to bicipital/hamstring tendinitis  She has tenderness along the biceps femoris but has no range of motion or strength limitation  She also complains of paresthesias along the lower anterior leg  She does not have any weakness in the lower extremity  The patient has been able to do all workouts including dead lifts and pole workouts which put a lot of compression in this area  I prescribed her Voltaren gel that she can use in this area for the swelling and pain  She should avoid anything that puts pressure in this area for at least a week or two  The pain should not worsen any  I will see her back if her pain does not improve  Can consider advanced imaging if symptoms do not improve  Return if symptoms worsen or fail to improve  HPI:  Jordyn Jauregui is a 31 y o  female  who presents for evaluation of   Chief Complaint   Patient presents with    Right Knee - Pain       Onset/Mechanism:  A few days ago, another  noted that her fibular head is more prominent on 1 side  Only has pain pressing in that region  She has had no limitations from this  Location:  Right fibular head  Radiation:  Some tingling in the front of the distal leg  Quality:  Sore  Provocative:  Pressing on that area and sometimes when crossing the leg  Severity:  Does not limit her at all  Associated Symptoms:  Swelling    Treatment so far:  Nothing  Review of Systems   Constitutional: Negative for activity change and fever  HENT: Negative for sore throat  Eyes: Negative for visual disturbance  Respiratory: Negative for shortness of breath  Cardiovascular: Negative for chest pain  Gastrointestinal: Negative for abdominal pain  Endocrine: Negative for polydipsia  Genitourinary: Negative for difficulty urinating  Musculoskeletal: Negative for arthralgias  Skin: Negative for rash  Allergic/Immunologic: Negative for immunocompromised state  Neurological: Positive for numbness  Negative for weakness  Hematological: Does not bruise/bleed easily  Psychiatric/Behavioral: Negative for confusion  Following history reviewed and updated:  Past Medical History:   Diagnosis Date    Kidney stones      Past Surgical History:   Procedure Laterality Date    TONSILLECTOMY      WRIST SURGERY Left      Social History   Social History     Substance and Sexual Activity   Alcohol Use Yes    Frequency: 2-4 times a month    Drinks per session: 1 or 2    Binge frequency: Never     Social History     Substance and Sexual Activity   Drug Use No     Social History     Tobacco Use   Smoking Status Never Smoker   Smokeless Tobacco Never Used     Family History   Problem Relation Age of Onset    Hypertension Mother     Nephrolithiasis Mother     Breast cancer Mother     Hypertension Father     Nephrolithiasis Father     No Known Problems Sister     No Known Problems Brother     Heart disease Paternal Grandfather      No Known Allergies     Constitutional:  /83   Pulse 83   Ht 5' (1 524 m)   Wt 51 3 kg (113 lb)   BMI 22 07 kg/m²    General: NAD  Eyes: Anicteric sclerae  Neck: Supple  Lungs: Unlabored breathing  Cardiovascular: No lower extremity edema  Skin: Intact without erythema  Neurologic: Sensation intact to light touch  Psychiatric: Mood and affect are appropriate      Right Knee Exam     Muscle Strength The patient has normal right knee strength  Tenderness   Right knee tenderness location: Tender at the posterior aspect of the fibular head and there is a ropy of feeling to the biceps femoris tendon in this region  Range of Motion   The patient has normal right knee ROM  Tests   Yunier:  Medial - negative Lateral - negative  Varus: negative Valgus: negative  Lachman:  Anterior - negative    Posterior - negative    Other   Erythema: absent  Scars: absent  Sensation: normal  Pulse: present  Swelling: none  Effusion: no effusion present    Comments:  Normal bounce home test   Compartments are all soft and compressible  There is slight soft tissue swelling at fibular head  There is no erythema  Procedures - none for this visit  Portions of the record may have been created with voice recognition software  Occasional wrong word or "sound a like" substitutions may have occurred due to the inherent limitations of voice recognition software  Read the chart carefully and recognize, using context, where substitutions have occurred

## 2020-08-31 NOTE — PATIENT INSTRUCTIONS
You can obtain and use Voltaren (diclofenac) cream for your discomfort  This is now available over the counter at [employer] and online at [employer]  You can use this up to four times per day  It is best to wash the area with water and then pat dry  The cream absorbs better after this  Be careful not to let any pets or children get in contact with the cream as it is a medication      If you develop a skin reaction, stop using the cream

## 2020-09-08 ENCOUNTER — NURSE TRIAGE (OUTPATIENT)
Dept: OTHER | Facility: OTHER | Age: 25
End: 2020-09-08

## 2020-09-08 DIAGNOSIS — Z11.59 SPECIAL SCREENING EXAMINATION FOR VIRAL DISEASE: Primary | ICD-10-CM

## 2020-09-08 DIAGNOSIS — Z11.59 SPECIAL SCREENING EXAMINATION FOR VIRAL DISEASE: ICD-10-CM

## 2020-09-08 PROCEDURE — U0003 INFECTIOUS AGENT DETECTION BY NUCLEIC ACID (DNA OR RNA); SEVERE ACUTE RESPIRATORY SYNDROME CORONAVIRUS 2 (SARS-COV-2) (CORONAVIRUS DISEASE [COVID-19]), AMPLIFIED PROBE TECHNIQUE, MAKING USE OF HIGH THROUGHPUT TECHNOLOGIES AS DESCRIBED BY CMS-2020-01-R: HCPCS | Performed by: FAMILY MEDICINE

## 2020-09-08 NOTE — TELEPHONE ENCOUNTER
Reason for Disposition   COVID-19 Testing, questions about     Traveling and moving to [place]    Answer Assessment - Initial Assessment Questions  1  COVID testing needed prior to starting new job in [place] and also results need to be pending for her arrival in [place]  Pt is flying tomorrow    2  No known exposure and currently is not sick    Just needs the order for testing    Protocols used: CORONAVIRUS (COVID-19) EXPOSURE-ADULT-[place]

## 2020-09-08 NOTE — TELEPHONE ENCOUNTER
Regarding: Covid  ----- Message from Silvio Hendrickson sent at 9/8/2020  2:56 PM EDT -----  " Patient have no symptoms, need to be tested for new job in [place] "

## 2020-09-09 LAB — SARS-COV-2 RNA SPEC QL NAA+PROBE: NOT DETECTED

## 2021-04-30 ENCOUNTER — APPOINTMENT (RX ONLY)
Dept: URBAN - METROPOLITAN AREA OTHER 11 | Facility: OTHER | Age: 26
Setting detail: DERMATOLOGY
End: 2021-04-30

## 2021-04-30 DIAGNOSIS — L70.0 ACNE VULGARIS: ICD-10-CM

## 2021-04-30 DIAGNOSIS — T300 ERYTHEMA [FIRST DEGREE], UNSPECIFIED SITE: ICD-10-CM

## 2021-04-30 PROBLEM — T30.0 BURN OF UNSPECIFIED BODY REGION, UNSPECIFIED DEGREE: Status: ACTIVE | Noted: 2021-04-30

## 2021-04-30 PROCEDURE — ? COUNSELING

## 2021-04-30 PROCEDURE — 99203 OFFICE O/P NEW LOW 30 MIN: CPT

## 2021-04-30 PROCEDURE — ? PRESCRIPTION MEDICATION MANAGEMENT

## 2021-04-30 PROCEDURE — ? PRESCRIPTION

## 2021-04-30 PROCEDURE — ? TREATMENT REGIMEN

## 2021-04-30 RX ORDER — DAPSONE 50 MG/G
GEL TOPICAL
Qty: 1 | Refills: 3 | Status: ERX | COMMUNITY
Start: 2021-04-30

## 2021-04-30 RX ORDER — CLINDAMYCIN PHOSPHATE AND BENZOYL PEROXIDE 10; 50 MG/G; MG/G
GEL TOPICAL BID
Qty: 1 | Refills: 3 | Status: ERX | COMMUNITY
Start: 2021-04-30

## 2021-04-30 RX ORDER — TRETIONIN 1 MG/G
CREAM TOPICAL
Qty: 1 | Refills: 3 | Status: ERX | COMMUNITY
Start: 2021-04-30

## 2021-04-30 RX ADMIN — DAPSONE: 50 GEL TOPICAL at 00:00

## 2021-04-30 RX ADMIN — CLINDAMYCIN PHOSPHATE AND BENZOYL PEROXIDE: 10; 50 GEL TOPICAL at 00:00

## 2021-04-30 RX ADMIN — TRETIONIN: 1 CREAM TOPICAL at 00:00

## 2021-04-30 NOTE — PROCEDURE: COUNSELING
Isotretinoin Counseling: Patient should get monthly blood tests, not donate blood, not drive at night if vision affected, not share medication, and not undergo elective surgery for 6 months after tx completed. Side effects reviewed, pt to contact office should one occur.
Topical Clindamycin Pregnancy And Lactation Text: This medication is Pregnancy Category B and is considered safe during pregnancy. It is unknown if it is excreted in breast milk.
Use Enhanced Medication Counseling?: No
Topical Clindamycin Counseling: Patient counseled that this medication may cause skin irritation or allergic reactions.  In the event of skin irritation, the patient was advised to reduce the amount of the drug applied or use it less frequently.   The patient verbalized understanding of the proper use and possible adverse effects of clindamycin.  All of the patient's questions and concerns were addressed.
Doxycycline Counseling:  Patient counseled regarding possible photosensitivity and increased risk for sunburn.  Patient instructed to avoid sunlight, if possible.  When exposed to sunlight, patients should wear protective clothing, sunglasses, and sunscreen.  The patient was instructed to call the office immediately if the following severe adverse effects occur:  hearing changes, easy bruising/bleeding, severe headache, or vision changes.  The patient verbalized understanding of the proper use and possible adverse effects of doxycycline.  All of the patient's questions and concerns were addressed.
Spironolactone Pregnancy And Lactation Text: This medication can cause feminization of the male fetus and should be avoided during pregnancy. The active metabolite is also found in breast milk.
Bactrim Pregnancy And Lactation Text: This medication is Pregnancy Category D and is known to cause fetal risk.  It is also excreted in breast milk.
Minocycline Pregnancy And Lactation Text: This medication is Pregnancy Category D and not consider safe during pregnancy. It is also excreted in breast milk.
Topical Retinoid counseling:  Patient advised to apply a pea-sized amount only at bedtime and wait 30 minutes after washing their face before applying.  If too drying, patient may add a non-comedogenic moisturizer. The patient verbalized understanding of the proper use and possible adverse effects of retinoids.  All of the patient's questions and concerns were addressed.
Birth Control Pills Counseling: Birth Control Pill Counseling: I discussed with the patient the potential side effects of OCPs including but not limited to increased risk of stroke, heart attack, thrombophlebitis, deep venous thrombosis, hepatic adenomas, breast changes, GI upset, headaches, and depression.  The patient verbalized understanding of the proper use and possible adverse effects of OCPs. All of the patient's questions and concerns were addressed.
Sarecycline Counseling: Patient advised regarding possible photosensitivity and discoloration of the teeth, skin, lips, tongue and gums.  Patient instructed to avoid sunlight, if possible.  When exposed to sunlight, patients should wear protective clothing, sunglasses, and sunscreen.  The patient was instructed to call the office immediately if the following severe adverse effects occur:  hearing changes, easy bruising/bleeding, severe headache, or vision changes.  The patient verbalized understanding of the proper use and possible adverse effects of sarecycline.  All of the patient's questions and concerns were addressed.
Topical Retinoid Pregnancy And Lactation Text: This medication is Pregnancy Category C. It is unknown if this medication is excreted in breast milk.
Isotretinoin Pregnancy And Lactation Text: This medication is Pregnancy Category X and is considered extremely dangerous during pregnancy. It is unknown if it is excreted in breast milk.
Topical Sulfur Applications Counseling: Topical Sulfur Counseling: Patient counseled that this medication may cause skin irritation or allergic reactions.  In the event of skin irritation, the patient was advised to reduce the amount of the drug applied or use it less frequently.   The patient verbalized understanding of the proper use and possible adverse effects of topical sulfur application.  All of the patient's questions and concerns were addressed.
Tetracycline Counseling: Patient counseled regarding possible photosensitivity and increased risk for sunburn.  Patient instructed to avoid sunlight, if possible.  When exposed to sunlight, patients should wear protective clothing, sunglasses, and sunscreen.  The patient was instructed to call the office immediately if the following severe adverse effects occur:  hearing changes, easy bruising/bleeding, severe headache, or vision changes.  The patient verbalized understanding of the proper use and possible adverse effects of tetracycline.  All of the patient's questions and concerns were addressed. Patient understands to avoid pregnancy while on therapy due to potential birth defects.
Azithromycin Counseling:  I discussed with the patient the risks of azithromycin including but not limited to GI upset, allergic reaction, drug rash, diarrhea, and yeast infections.
Doxycycline Pregnancy And Lactation Text: This medication is Pregnancy Category D and not consider safe during pregnancy. It is also excreted in breast milk but is considered safe for shorter treatment courses.
Erythromycin Counseling:  I discussed with the patient the risks of erythromycin including but not limited to GI upset, allergic reaction, drug rash, diarrhea, increase in liver enzymes, and yeast infections.
Birth Control Pills Pregnancy And Lactation Text: This medication should be avoided if pregnant and for the first 30 days post-partum.
Benzoyl Peroxide Counseling: Patient counseled that medicine may cause skin irritation and bleach clothing.  In the event of skin irritation, the patient was advised to reduce the amount of the drug applied or use it less frequently.   The patient verbalized understanding of the proper use and possible adverse effects of benzoyl peroxide.  All of the patient's questions and concerns were addressed.
Detail Level: Zone
High Dose Vitamin A Counseling: Side effects reviewed, pt to contact office should one occur.
High Dose Vitamin A Pregnancy And Lactation Text: High dose vitamin A therapy is contraindicated during pregnancy and breast feeding.
Topical Sulfur Applications Pregnancy And Lactation Text: This medication is Pregnancy Category C and has an unknown safety profile during pregnancy. It is unknown if this topical medication is excreted in breast milk.
Azithromycin Pregnancy And Lactation Text: This medication is considered safe during pregnancy and is also secreted in breast milk.
Tazorac Counseling:  Patient advised that medication is irritating and drying.  Patient may need to apply sparingly and wash off after an hour before eventually leaving it on overnight.  The patient verbalized understanding of the proper use and possible adverse effects of tazorac.  All of the patient's questions and concerns were addressed.
Spironolactone Counseling: Patient advised regarding risks of diarrhea, abdominal pain, hyperkalemia, birth defects (for female patients), liver toxicity and renal toxicity. The patient may need blood work to monitor liver and kidney function and potassium levels while on therapy. The patient verbalized understanding of the proper use and possible adverse effects of spironolactone.  All of the patient's questions and concerns were addressed.
Benzoyl Peroxide Pregnancy And Lactation Text: This medication is Pregnancy Category C. It is unknown if benzoyl peroxide is excreted in breast milk.
Bactrim Counseling:  I discussed with the patient the risks of sulfa antibiotics including but not limited to GI upset, allergic reaction, drug rash, diarrhea, dizziness, photosensitivity, and yeast infections.  Rarely, more serious reactions can occur including but not limited to aplastic anemia, agranulocytosis, methemoglobinemia, blood dyscrasias, liver or kidney failure, lung infiltrates or desquamative/blistering drug rashes.
Dapsone Counseling: I discussed with the patient the risks of dapsone including but not limited to hemolytic anemia, agranulocytosis, rashes, methemoglobinemia, kidney failure, peripheral neuropathy, headaches, GI upset, and liver toxicity.  Patients who start dapsone require monitoring including baseline LFTs and weekly CBCs for the first month, then every month thereafter.  The patient verbalized understanding of the proper use and possible adverse effects of dapsone.  All of the patient's questions and concerns were addressed.
Dapsone Pregnancy And Lactation Text: This medication is Pregnancy Category C and is not considered safe during pregnancy or breast feeding.
Minocycline Counseling: Patient advised regarding possible photosensitivity and discoloration of the teeth, skin, lips, tongue and gums.  Patient instructed to avoid sunlight, if possible.  When exposed to sunlight, patients should wear protective clothing, sunglasses, and sunscreen.  The patient was instructed to call the office immediately if the following severe adverse effects occur:  hearing changes, easy bruising/bleeding, severe headache, or vision changes.  The patient verbalized understanding of the proper use and possible adverse effects of minocycline.  All of the patient's questions and concerns were addressed.
Tazorac Pregnancy And Lactation Text: This medication is not safe during pregnancy. It is unknown if this medication is excreted in breast milk.
Erythromycin Pregnancy And Lactation Text: This medication is Pregnancy Category B and is considered safe during pregnancy. It is also excreted in breast milk.
Detail Level: Detailed

## 2021-04-30 NOTE — HPI: PIMPLES (ACNE)
Is This A New Presentation, Or A Follow-Up?: Acne
Additional Comments (Use Complete Sentences): Patient states she recently moved to [place] and would like to get established with a dermatologist. Patient reports her current treatment regimen is working well. Patient also noted that she recently got her face waxed, and an  lifted her, patient did go to an urgent and received a cooling anti bacterial topical. She presents for an evaluation.

## 2021-04-30 NOTE — PROCEDURE: PRESCRIPTION MEDICATION MANAGEMENT
Detail Level: Zone
Render In Strict Bullet Format?: No
Continue Regimen: Benzaclin 1-5% gel, dapsone gel, and Tretinoin 0.1% cream

## 2021-07-09 PROBLEM — N87.0 DYSPLASIA OF CERVIX, LOW GRADE (CIN 1): Status: ACTIVE | Noted: 2021-07-09

## 2021-07-22 NOTE — PROGRESS NOTES
Assessment/Plan:  Normal gynecologic exam  Dysmenorrhea/Acne- rec OC, fully explained- she'll consider and text prn  BCM- Condoms  Pap q 3 yrs- '20- YUSRA 1- CoTest now  GC, chlamydia Cx's - done  Self breast exams monthly-   Calcium 1000 milligrams daily- she does  Exercise- she does daily  Return the office in 1 year  Diagnoses and all orders for this visit:    Encounter for annual routine gynecological examination  -     Liquid-based pap, screening    Dysplasia of cervix, low grade (YUSRA 1)  -     Liquid-based pap, screening    Screening for cervical cancer  -     Liquid-based pap, screening    Other orders  -     Cancel: Liquid-based pap, screening              Subjective:        Patient ID: Perla Dyer is a 31 y o  female  Cheryle Cough is here for a yearly evaluation  She did moved to [place]  She is involved in preventive care for  of the [employer]  She actually is now dating a   They are using condoms  She still experiences dysmenorrhea and since age 24 acne  She is hesitant to use oral contraceptives in case they might interfere with conception in the future  The following portions of the patient's history were reviewed and updated as appropriate: She  has a past medical history of Kidney stones  Patient Active Problem List    Diagnosis Date Noted    Dysplasia of cervix, low grade (YUSRA 1) 07/09/2021    Acute pain of right knee 08/31/2020    Encounter for annual routine gynecological examination 06/21/2018    Routine screening for STI (sexually transmitted infection) 06/21/2018   PMH:      Menarche      Dysmenorrhea  Nephrolithiasis 9/15   Chlamydia '15      Ureaplasma, Monilia '16       Acne '18      YUSRA 1 '20  She  has a past surgical history that includes Tonsillectomy and Wrist surgery (Left)    Her family history includes Breast cancer in her mother; Heart disease in her paternal grandfather; Hypertension in her father and mother; Nephrolithiasis in her father and mother; No Known Problems in her brother and sister  FH:  F, M- HTN, nephrolithiasis  M- Breast Ca 10/19 58  She  reports that she has never smoked  She has never used smokeless tobacco  She reports current alcohol use  She reports that she does not use drugs  SH:  Graduated April  [employer], [employer] for graduate school in athletic training '18  She is sexually active with a new partner   Worked for [employer] as the  for [employer], then [address] but 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  She moved to [place] and is involved in preventive care [ neck and back] as a civilian contractor for [employer], specifically [employer] [address] [phone]  Current Outpatient Medications   Medication Sig Dispense Refill    clindamycin-benzoyl peroxide (BENZACLIN) gel       Dapsone 5 % topical gel       diclofenac sodium (VOLTAREN) 1 % Apply 2 g topically 3 (three) times a day as needed (Pain) 100 g 1    tretinoin (RETIN-A) 0 1 % cream        No current facility-administered medications for this visit  Current Outpatient Medications on File Prior to Visit   Medication Sig    clindamycin-benzoyl peroxide (BENZACLIN) gel     Dapsone 5 % topical gel     diclofenac sodium (VOLTAREN) 1 % Apply 2 g topically 3 (three) times a day as needed (Pain)    tretinoin (RETIN-A) 0 1 % cream      No current facility-administered medications on file prior to visit  She has No Known Allergies       Review of Systems   Constitutional: Negative for activity change, appetite change, fatigue and unexpected weight change  Eyes: Negative for visual disturbance  Respiratory: Negative for cough, chest tightness, shortness of breath and wheezing  Cardiovascular: Negative for chest pain, palpitations and leg swelling  Breast: Patient denies tenderness, nipple discharge, masses, or erythema     Gastrointestinal: Negative for abdominal distention, abdominal pain, blood in stool, constipation, diarrhea, nausea and vomiting  Endocrine: Negative for cold intolerance and heat intolerance  Genitourinary: Negative for decreased urine volume, difficulty urinating, dyspareunia, dysuria, frequency, hematuria, menstrual problem, pelvic pain, urgency, vaginal bleeding, vaginal discharge and vaginal pain  Musculoskeletal: Negative for arthralgias  Skin: Negative for rash  Neurological: Negative for weakness, light-headedness, numbness and headaches  Hematological: Does not bruise/bleed easily  Psychiatric/Behavioral: Negative for agitation, behavioral problems and sleep disturbance  The patient is not nervous/anxious  Objective:    Vitals:    07/23/21 0906   BP: 106/64   Weight: 53 kg (116 lb 12 8 oz)            Physical Exam  Vitals and nursing note reviewed  Constitutional:       General: She is not in acute distress  Appearance: She is well-developed  HENT:      Head: Normocephalic and atraumatic  Eyes:      General: No scleral icterus  Right eye: No discharge  Left eye: No discharge  Extraocular Movements: Extraocular movements intact  Conjunctiva/sclera: Conjunctivae normal    Neck:      Thyroid: No thyromegaly  Trachea: No tracheal deviation  Cardiovascular:      Rate and Rhythm: Normal rate and regular rhythm  Heart sounds: Normal heart sounds  No murmur heard  Pulmonary:      Effort: Pulmonary effort is normal  No respiratory distress  Breath sounds: Normal breath sounds  No wheezing  Chest:      Breasts: Breasts are symmetrical          Right: No inverted nipple, mass, nipple discharge, skin change or tenderness  Left: No inverted nipple, mass, nipple discharge, skin change or tenderness  Abdominal:      General: Bowel sounds are normal  There is no distension  Palpations: Abdomen is soft  There is no mass  Tenderness: There is no abdominal tenderness   There is no right CVA tenderness, left CVA tenderness, guarding or rebound  Genitourinary:     General: Normal vulva  Labia:         Right: No rash, tenderness or lesion  Left: No rash, tenderness or lesion  Vagina: Normal       Cervix: No cervical motion tenderness or discharge  Uterus: Not deviated, not enlarged and not tender  Adnexa:         Right: No mass, tenderness or fullness  Left: No mass, tenderness or fullness  Rectum: No external hemorrhoid  Comments: Urethral meatus within normal limits  Perineum within normal limits  Bladder well supported  Musculoskeletal:         General: No tenderness  Normal range of motion  Cervical back: Normal range of motion and neck supple  Lymphadenopathy:      Cervical: No cervical adenopathy  Skin:     General: Skin is warm and dry  Neurological:      Mental Status: She is alert and oriented to person, place, and time  Psychiatric:         Mood and Affect: Mood normal          Behavior: Behavior normal          Thought Content:  Thought content normal          Judgment: Judgment normal

## 2021-07-23 ENCOUNTER — ANNUAL EXAM (OUTPATIENT)
Dept: OBGYN CLINIC | Facility: CLINIC | Age: 26
End: 2021-07-23
Payer: COMMERCIAL

## 2021-07-23 VITALS — SYSTOLIC BLOOD PRESSURE: 106 MMHG | WEIGHT: 116.8 LBS | DIASTOLIC BLOOD PRESSURE: 64 MMHG | BODY MASS INDEX: 22.81 KG/M2

## 2021-07-23 DIAGNOSIS — N87.0 DYSPLASIA OF CERVIX, LOW GRADE (CIN 1): ICD-10-CM

## 2021-07-23 DIAGNOSIS — Z12.4 SCREENING FOR CERVICAL CANCER: ICD-10-CM

## 2021-07-23 DIAGNOSIS — Z01.419 ENCOUNTER FOR ANNUAL ROUTINE GYNECOLOGICAL EXAMINATION: Primary | ICD-10-CM

## 2021-07-23 DIAGNOSIS — Z11.3 SCREEN FOR SEXUALLY TRANSMITTED DISEASES: ICD-10-CM

## 2021-07-23 PROCEDURE — 99395 PREV VISIT EST AGE 18-39: CPT | Performed by: OBSTETRICS & GYNECOLOGY

## 2021-07-23 PROCEDURE — G0145 SCR C/V CYTO,THINLAYER,RESCR: HCPCS | Performed by: PATHOLOGY

## 2021-07-23 PROCEDURE — G0124 SCREEN C/V THIN LAYER BY MD: HCPCS | Performed by: PATHOLOGY

## 2021-07-23 PROCEDURE — G0476 HPV COMBO ASSAY CA SCREEN: HCPCS | Performed by: OBSTETRICS & GYNECOLOGY

## 2021-07-27 ENCOUNTER — TELEPHONE (OUTPATIENT)
Dept: OBGYN CLINIC | Facility: CLINIC | Age: 26
End: 2021-07-27

## 2021-07-27 LAB
HPV HR 12 DNA CVX QL NAA+PROBE: POSITIVE
HPV16 DNA CVX QL NAA+PROBE: NEGATIVE
HPV18 DNA CVX QL NAA+PROBE: NEGATIVE

## 2021-07-27 NOTE — TELEPHONE ENCOUNTER
Please let Beth Sevilla know that the Pap smear itself is not yet back yet  We normally do not test for HPV when a patient is in their 25s because 80% of young men and women have HPV in their 25s  I screened for that now because she had mild dysplasia last year  The result is most likely a reflection of prior HPV and should be cleared by her immune system although that may take a few years  Let us wait till the Pap smear returns and I will try to clarify things further

## 2021-07-27 NOTE — TELEPHONE ENCOUNTER
----- Message from Augusto Harris sent at 7/27/2021  7:29 AM EDT -----  Regarding: Test Results Question  Contact: [phone]  He Doctor Kassandra Foy,    Is this saying I have high risk HPV? Or that I am still positive for HPV?

## 2021-08-02 ENCOUNTER — TELEPHONE (OUTPATIENT)
Dept: OBGYN CLINIC | Facility: CLINIC | Age: 26
End: 2021-08-02

## 2021-08-02 LAB
LAB AP GYN PRIMARY INTERPRETATION: ABNORMAL
Lab: ABNORMAL
PATH INTERP SPEC-IMP: ABNORMAL

## 2021-08-02 NOTE — TELEPHONE ENCOUNTER
----- Message from Augusto Harris sent at 8/2/2021 12:13 PM EDT -----  Regarding: Non-Urgent Medical Question  Contact: [phone]  [facility] Doctor Tiffani Sawant,    I just saw these new results came back  Do you think the HPV has gotten worse? Or is there anything else I need to do moving forward?       Thank you,  Russell Nicholas
Please pass this on:    [facility],     Last year your pap smear was abnormal  Colposcopy revealed Mild Cervical Dysplasia  This usually resolves over time  In the last conversation I mentioned that 80% of people in their 25s have HPV  The current HPV may have been from a prior partner or your new partner  The Pap smear is actually slightly better than last year  There are 14 high-risk HPV types  High-risk HPV 16 and 18 are the worst and represent 70% of all cervical cancers  Those are the ones we are really concerned about and you do not have either of them  You have 1 of the other 12 which are not as aggressive  We put them in the category of "other " They are not as important  We will retest next year  I am not sure if you come home to visit for more than 1 week but if you do try to have our appointment in the very beginning so that if you do need a repeat colposcopy I can fit you in  Bottom line is please do not worry about this  It most likely will resolve and has nothing to do with fertility 
Implemented All Universal Safety Interventions:  [place] to call system. Call bell, personal items and telephone within reach. Instruct patient to call for assistance. Room bathroom lighting operational. Non-slip footwear when patient is off stretcher. Physically safe environment: no spills, clutter or unnecessary equipment. Stretcher in lowest position, wheels locked, appropriate side rails in place.

## 2024-02-21 PROBLEM — Z01.419 ENCOUNTER FOR ANNUAL ROUTINE GYNECOLOGICAL EXAMINATION: Status: RESOLVED | Noted: 2018-06-21 | Resolved: 2024-02-21

## 2024-02-21 PROBLEM — Z11.3 ROUTINE SCREENING FOR STI (SEXUALLY TRANSMITTED INFECTION): Status: RESOLVED | Noted: 2018-06-21 | Resolved: 2024-02-21

## 2024-09-13 ENCOUNTER — APPOINTMENT (OUTPATIENT)
Dept: URGENT CARE | Age: 29
End: 2024-09-13